# Patient Record
Sex: FEMALE | Race: WHITE | NOT HISPANIC OR LATINO | URBAN - METROPOLITAN AREA
[De-identification: names, ages, dates, MRNs, and addresses within clinical notes are randomized per-mention and may not be internally consistent; named-entity substitution may affect disease eponyms.]

---

## 2021-05-07 ENCOUNTER — OFFICE VISIT (OUTPATIENT)
Dept: URBAN - METROPOLITAN AREA CLINIC 113 | Facility: CLINIC | Age: 66
End: 2021-05-07
Payer: MEDICARE

## 2021-05-07 ENCOUNTER — LAB OUTSIDE AN ENCOUNTER (OUTPATIENT)
Dept: URBAN - METROPOLITAN AREA CLINIC 113 | Facility: CLINIC | Age: 66
End: 2021-05-07

## 2021-05-07 ENCOUNTER — WEB ENCOUNTER (OUTPATIENT)
Dept: URBAN - METROPOLITAN AREA CLINIC 113 | Facility: CLINIC | Age: 66
End: 2021-05-07

## 2021-05-07 VITALS
RESPIRATION RATE: 20 BRPM | DIASTOLIC BLOOD PRESSURE: 81 MMHG | BODY MASS INDEX: 35.84 KG/M2 | WEIGHT: 242 LBS | HEIGHT: 69 IN | TEMPERATURE: 97.7 F | HEART RATE: 54 BPM | SYSTOLIC BLOOD PRESSURE: 142 MMHG

## 2021-05-07 DIAGNOSIS — K57.92 DIVERTICULITIS: ICD-10-CM

## 2021-05-07 PROCEDURE — 99204 OFFICE O/P NEW MOD 45 MIN: CPT | Performed by: INTERNAL MEDICINE

## 2021-05-07 RX ORDER — METRONIDAZOLE 500 MG/1
1 TABLET TABLET ORAL THREE TIMES A DAY
Status: ACTIVE | COMMUNITY

## 2021-05-07 RX ORDER — GLUCOSAMINE/CHONDR SU A SOD 750-600 MG
1 CAPSULE TABLET ORAL ONCE A DAY
Status: ACTIVE | COMMUNITY

## 2021-05-07 RX ORDER — MIRABEGRON 25 MG/1
1 TABLET TABLET, FILM COATED, EXTENDED RELEASE ORAL ONCE A DAY
Status: ACTIVE | COMMUNITY

## 2021-05-07 RX ORDER — CIPROFLOXACIN 500 MG/1
1 TABLET TABLET, FILM COATED ORAL
Status: ACTIVE | COMMUNITY

## 2021-05-07 RX ORDER — APIXABAN 5 MG/1
1 TABLET TABLET, FILM COATED ORAL TWICE A DAY
Status: ACTIVE | COMMUNITY

## 2021-05-07 RX ORDER — CALCIUM CARBONATE/VITAMIN D3 600 MG-20
1 TABLET WITH A MEAL TABLET ORAL ONCE A DAY
Status: ACTIVE | COMMUNITY

## 2021-05-07 RX ORDER — ROSUVASTATIN CALCIUM 20 MG/1
1 TABLET TABLET, FILM COATED ORAL ONCE A DAY
Status: ACTIVE | COMMUNITY

## 2021-05-07 RX ORDER — SODIUM, POTASSIUM,MAG SULFATES 17.5-3.13G
354ML SOLUTION, RECONSTITUTED, ORAL ORAL
Qty: 354 MILLILITER | Refills: 1 | OUTPATIENT
Start: 2021-05-07 | End: 2021-07-06

## 2021-05-07 RX ORDER — SOTALOL HYDROCHLORIDE 80 MG/1
1/2 TABLET TABLET ORAL ONCE A DAY
Status: ACTIVE | COMMUNITY

## 2021-05-07 RX ORDER — LEVOTHYROXINE SODIUM 0.05 MG/1
1 TABLET IN THE MORNING ON AN EMPTY STOMACH TABLET ORAL ONCE A DAY
Status: ACTIVE | COMMUNITY

## 2021-05-07 NOTE — HPI-TODAY'S VISIT:
The patient is a 65-year-old female with a history of adenomatous colon polyps and a family history of colorectal cancer who presents for follow-up of acute diverticulitis.  She has had multiple bouts of Diverticulitis in the past, dating back to her 40s, spent about 25 years.  Over the last 20 years, she has had 6 distinct episodes, all of which been uncomplicated.  She had an episode of diverticulitis in January 2020, and was seen at [facility], where a CT scan showed mild sigmoid colon diverticulitis.  She was treated with a single dose of IV Zosyn, followed by Augmentin, and completed 10 days of therapy with this with no recurrence.  She did well after that until about 2 weeks ago, when she had an episode of fairly typical Dr. colitis and took Augmentin for 4 days which seem to resolve her symptoms.  Unfortunately, the symptoms returned 3 days ago, and have been quite severe.  She was seen in the [place] ER 3 days ago, and had labs done and a CT scan which again showed acute uncomplicated mild diverticulitis.  She was placed on ciprofloxacin and Flagyl, and her symptoms have gradually improved since then.  She is still taking that therapy and plans take a full 10 day course.  Of note, the patient did see a gastrologist in [place] recommended that she see a colorectal surgeon for consideration of colon surgery/bowel resection.  This recommendation  prompted her to seek a second opinion.  Of note, the patient has had regular colonoscopies since age 41.  She has had polyps in the past.  Her last colonoscopy from Dr. Cedric Romero in [place] for years ago, but he is retired.  We do not have copies of his report.

## 2021-05-07 NOTE — EXAM-FUNCTIONAL ASSESSMENT
General--no acute distress Eyes--anicteric HENT--normocephalic, atraumatic head Neck--no lymphadenopathy Chest--normal breath sounds Heart--regular rate and rhythm Abdomen--soft, LLQ mildly tender, non distended, bowel sounds present Musculoskeletal--normal gait and station Skin--no rashes Neurologic--Alert and oriented x 3 Psychiatric--stable mood, appropriate affect

## 2021-05-10 ENCOUNTER — TELEPHONE ENCOUNTER (OUTPATIENT)
Dept: URBAN - METROPOLITAN AREA CLINIC 113 | Facility: CLINIC | Age: 66
End: 2021-05-10

## 2021-05-10 RX ORDER — ROSUVASTATIN CALCIUM 20 MG/1
1 TABLET TABLET, FILM COATED ORAL ONCE A DAY
Status: ACTIVE | COMMUNITY

## 2021-05-10 RX ORDER — LEVOTHYROXINE SODIUM 0.05 MG/1
1 TABLET IN THE MORNING ON AN EMPTY STOMACH TABLET ORAL ONCE A DAY
Status: ACTIVE | COMMUNITY

## 2021-05-10 RX ORDER — SODIUM, POTASSIUM,MAG SULFATES 17.5-3.13G
354ML SOLUTION, RECONSTITUTED, ORAL ORAL
Qty: 354 MILLILITER | Refills: 1 | Status: ACTIVE | COMMUNITY
Start: 2021-05-07 | End: 2021-07-06

## 2021-05-10 RX ORDER — MIRABEGRON 25 MG/1
1 TABLET TABLET, FILM COATED, EXTENDED RELEASE ORAL ONCE A DAY
Status: ACTIVE | COMMUNITY

## 2021-05-10 RX ORDER — APIXABAN 5 MG/1
1 TABLET TABLET, FILM COATED ORAL TWICE A DAY
Status: ACTIVE | COMMUNITY

## 2021-05-10 RX ORDER — METRONIDAZOLE 500 MG/1
1 TABLET TABLET ORAL THREE TIMES A DAY
Status: ACTIVE | COMMUNITY

## 2021-05-10 RX ORDER — SODIUM PICOSULFATE, MAGNESIUM OXIDE, AND ANHYDROUS CITRIC ACID 10; 3.5; 12 MG/160ML; G/160ML; G/160ML
160 ML LIQUID ORAL
Qty: 320 MILLILITER | Refills: 0 | OUTPATIENT
Start: 2021-05-10 | End: 2021-05-11

## 2021-05-10 RX ORDER — CIPROFLOXACIN 500 MG/1
1 TABLET TABLET, FILM COATED ORAL
Status: ACTIVE | COMMUNITY

## 2021-05-10 RX ORDER — GLUCOSAMINE/CHONDR SU A SOD 750-600 MG
1 CAPSULE TABLET ORAL ONCE A DAY
Status: ACTIVE | COMMUNITY

## 2021-05-10 RX ORDER — CALCIUM CARBONATE/VITAMIN D3 600 MG-20
1 TABLET WITH A MEAL TABLET ORAL ONCE A DAY
Status: ACTIVE | COMMUNITY

## 2021-05-10 RX ORDER — SOTALOL HYDROCHLORIDE 80 MG/1
1/2 TABLET TABLET ORAL ONCE A DAY
Status: ACTIVE | COMMUNITY

## 2021-05-21 PROBLEM — 15167005 ALCOHOL ABUSE: Status: ACTIVE | Noted: 2021-05-21

## 2021-07-21 ENCOUNTER — TELEPHONE ENCOUNTER (OUTPATIENT)
Dept: URBAN - METROPOLITAN AREA CLINIC 113 | Facility: CLINIC | Age: 66
End: 2021-07-21

## 2021-07-21 RX ORDER — CIPROFLOXACIN 500 MG/1
1 TABLET TABLET, FILM COATED ORAL
Qty: 20 TABLET | Refills: 0

## 2021-07-21 RX ORDER — METRONIDAZOLE 500 MG/1
1 TABLET TABLET ORAL THREE TIMES A DAY
Qty: 30 TABLET | Refills: 0

## 2021-08-10 ENCOUNTER — TELEPHONE ENCOUNTER (OUTPATIENT)
Dept: URBAN - METROPOLITAN AREA CLINIC 113 | Facility: CLINIC | Age: 66
End: 2021-08-10

## 2021-08-11 PROBLEM — 307496006: Status: ACTIVE | Noted: 2021-05-07

## 2021-08-19 ENCOUNTER — OFFICE VISIT (OUTPATIENT)
Dept: URBAN - METROPOLITAN AREA SURGERY CENTER 25 | Facility: SURGERY CENTER | Age: 66
End: 2021-08-19

## 2021-08-30 ENCOUNTER — TELEPHONE ENCOUNTER (OUTPATIENT)
Dept: URBAN - METROPOLITAN AREA CLINIC 113 | Facility: CLINIC | Age: 66
End: 2021-08-30

## 2021-08-30 RX ORDER — CIPROFLOXACIN 500 MG/1
1 TABLET TABLET, FILM COATED ORAL
Qty: 20 TABLET | Refills: 0
End: 2021-09-09

## 2021-08-30 RX ORDER — METRONIDAZOLE 500 MG/1
1 TABLET TABLET ORAL THREE TIMES A DAY
Qty: 30 TABLET | Refills: 0
End: 2021-09-09

## 2021-09-09 ENCOUNTER — CLAIMS CREATED FROM THE CLAIM WINDOW (OUTPATIENT)
Dept: URBAN - METROPOLITAN AREA CLINIC 4 | Facility: CLINIC | Age: 66
End: 2021-09-09
Payer: MEDICARE

## 2021-09-09 ENCOUNTER — OFFICE VISIT (OUTPATIENT)
Dept: URBAN - METROPOLITAN AREA SURGERY CENTER 25 | Facility: SURGERY CENTER | Age: 66
End: 2021-09-09
Payer: MEDICARE

## 2021-09-09 DIAGNOSIS — R93.3 ABN FINDINGS-GI TRACT: ICD-10-CM

## 2021-09-09 DIAGNOSIS — D12.3 BENIGN NEOPLASM OF TRANSVERSE COLON: ICD-10-CM

## 2021-09-09 DIAGNOSIS — D12.3 ADENOMA OF TRANSVERSE COLON: ICD-10-CM

## 2021-09-09 DIAGNOSIS — D12.2 ADENOMA OF ASCENDING COLON: ICD-10-CM

## 2021-09-09 DIAGNOSIS — D12.2 BENIGN NEOPLASM OF ASCENDING COLON: ICD-10-CM

## 2021-09-09 DIAGNOSIS — Z87.19 H/O DIVERTICULITIS OF COLON: ICD-10-CM

## 2021-09-09 PROCEDURE — G8907 PT DOC NO EVENTS ON DISCHARG: HCPCS | Performed by: INTERNAL MEDICINE

## 2021-09-09 PROCEDURE — 88305 TISSUE EXAM BY PATHOLOGIST: CPT | Performed by: PATHOLOGY

## 2021-09-09 PROCEDURE — 45385 COLONOSCOPY W/LESION REMOVAL: CPT | Performed by: INTERNAL MEDICINE

## 2021-09-09 RX ORDER — APIXABAN 5 MG/1
1 TABLET TABLET, FILM COATED ORAL TWICE A DAY
Status: ACTIVE | COMMUNITY

## 2021-09-09 RX ORDER — MIRABEGRON 25 MG/1
1 TABLET TABLET, FILM COATED, EXTENDED RELEASE ORAL ONCE A DAY
Status: ACTIVE | COMMUNITY

## 2021-09-09 RX ORDER — GLUCOSAMINE/CHONDR SU A SOD 750-600 MG
1 CAPSULE TABLET ORAL ONCE A DAY
Status: ACTIVE | COMMUNITY

## 2021-09-09 RX ORDER — ROSUVASTATIN CALCIUM 20 MG/1
1 TABLET TABLET, FILM COATED ORAL ONCE A DAY
Status: ACTIVE | COMMUNITY

## 2021-09-09 RX ORDER — LEVOTHYROXINE SODIUM 0.05 MG/1
1 TABLET IN THE MORNING ON AN EMPTY STOMACH TABLET ORAL ONCE A DAY
Status: ACTIVE | COMMUNITY

## 2021-09-09 RX ORDER — METRONIDAZOLE 500 MG/1
1 TABLET TABLET ORAL THREE TIMES A DAY
Qty: 30 TABLET | Refills: 0 | Status: ACTIVE | COMMUNITY
End: 2021-09-09

## 2021-09-09 RX ORDER — SOTALOL HYDROCHLORIDE 80 MG/1
1/2 TABLET TABLET ORAL ONCE A DAY
Status: ACTIVE | COMMUNITY

## 2021-09-09 RX ORDER — CIPROFLOXACIN 500 MG/1
1 TABLET TABLET, FILM COATED ORAL
Qty: 20 TABLET | Refills: 0 | Status: ACTIVE | COMMUNITY
End: 2021-09-09

## 2021-09-09 RX ORDER — CALCIUM CARBONATE/VITAMIN D3 600 MG-20
1 TABLET WITH A MEAL TABLET ORAL ONCE A DAY
Status: ACTIVE | COMMUNITY

## 2021-09-13 ENCOUNTER — OFFICE VISIT (OUTPATIENT)
Dept: URBAN - METROPOLITAN AREA CLINIC 107 | Facility: CLINIC | Age: 66
End: 2021-09-13

## 2021-10-07 ENCOUNTER — OFFICE VISIT (OUTPATIENT)
Dept: URBAN - METROPOLITAN AREA CLINIC 113 | Facility: CLINIC | Age: 66
End: 2021-10-07

## 2021-11-09 ENCOUNTER — OFFICE VISIT (OUTPATIENT)
Dept: URBAN - METROPOLITAN AREA CLINIC 107 | Facility: CLINIC | Age: 66
End: 2021-11-09

## 2021-12-02 ENCOUNTER — OFFICE VISIT (OUTPATIENT)
Dept: URBAN - METROPOLITAN AREA CLINIC 113 | Facility: CLINIC | Age: 66
End: 2021-12-02

## 2021-12-30 ENCOUNTER — OFFICE VISIT (OUTPATIENT)
Dept: URBAN - METROPOLITAN AREA CLINIC 113 | Facility: CLINIC | Age: 66
End: 2021-12-30
Payer: MEDICARE

## 2021-12-30 VITALS
WEIGHT: 244 LBS | HEIGHT: 69 IN | HEART RATE: 59 BPM | SYSTOLIC BLOOD PRESSURE: 151 MMHG | BODY MASS INDEX: 36.14 KG/M2 | DIASTOLIC BLOOD PRESSURE: 84 MMHG | TEMPERATURE: 97.8 F

## 2021-12-30 DIAGNOSIS — R10.32 LLQ ABDOMINAL PAIN: ICD-10-CM

## 2021-12-30 DIAGNOSIS — K57.32 DIVERTICULITIS, COLON: ICD-10-CM

## 2021-12-30 DIAGNOSIS — Z86.010 HISTORY OF ADENOMATOUS POLYP OF COLON: ICD-10-CM

## 2021-12-30 DIAGNOSIS — K57.30 COLON, DIVERTICULOSIS: ICD-10-CM

## 2021-12-30 PROCEDURE — 99214 OFFICE O/P EST MOD 30 MIN: CPT | Performed by: NURSE PRACTITIONER

## 2021-12-30 RX ORDER — SOTALOL HYDROCHLORIDE 80 MG/1
1/2 TABLET TABLET ORAL ONCE A DAY
Status: ACTIVE | COMMUNITY

## 2021-12-30 RX ORDER — CIPROFLOXACIN HYDROCHLORIDE 500 MG/1
1 TABLET TABLET, FILM COATED ORAL
Qty: 28 TABLET | Refills: 0 | OUTPATIENT

## 2021-12-30 RX ORDER — ROSUVASTATIN CALCIUM 20 MG/1
1 TABLET TABLET, FILM COATED ORAL ONCE A DAY
Status: ACTIVE | COMMUNITY

## 2021-12-30 RX ORDER — LEVOTHYROXINE SODIUM 0.05 MG/1
1 TABLET IN THE MORNING ON AN EMPTY STOMACH TABLET ORAL ONCE A DAY
Status: ACTIVE | COMMUNITY

## 2021-12-30 RX ORDER — GLUCOSAMINE/CHONDR SU A SOD 750-600 MG
1 CAPSULE TABLET ORAL ONCE A DAY
Status: ACTIVE | COMMUNITY

## 2021-12-30 RX ORDER — CALCIUM CARBONATE/VITAMIN D3 600 MG-20
1 TABLET WITH A MEAL TABLET ORAL ONCE A DAY
Status: ACTIVE | COMMUNITY

## 2021-12-30 RX ORDER — MIRABEGRON 25 MG/1
1 TABLET TABLET, FILM COATED, EXTENDED RELEASE ORAL ONCE A DAY
Status: ACTIVE | COMMUNITY

## 2021-12-30 RX ORDER — METRONIDAZOLE 500 MG/1
1 TABLET TABLET ORAL THREE TIMES A DAY
Qty: 42 TABLET | Refills: 0 | OUTPATIENT

## 2021-12-30 RX ORDER — DICYCLOMINE HYDROCHLORIDE 10 MG/1
1 TABLET CAPSULE ORAL
Qty: 60 | Refills: 3 | OUTPATIENT
Start: 2021-12-30 | End: 2022-04-29

## 2021-12-30 RX ORDER — APIXABAN 5 MG/1
1 TABLET TABLET, FILM COATED ORAL TWICE A DAY
Status: ACTIVE | COMMUNITY

## 2021-12-30 NOTE — PHYSICAL EXAM GASTROINTESTINAL
Abdomen , soft, mildly tender LLQ, nondistended , no guarding or rigidity , no masses palpable , normal bowel sounds , Liver and Spleen , no hepatosplenomegaly

## 2021-12-30 NOTE — HPI-OTHER HISTORIES
Colonoscopy 9/9/2021:BBPS 9, removal of 4 sessile transverse, hepatic flexure, and proximal ascending 7 to 13 mm polyps, sigmoid and descending diverticulosis, normal terminal ileum, mild grade 1 internal hemorrhoids.  Pathology: Hepatic flexure polyps were tubular adenomas, ascending polyps were sessile serrated adenomas, and transverse polyps were sessile serrated adenomas.  Surveillance recommended in 3 years.

## 2021-12-30 NOTE — HPI-TODAY'S VISIT:
This is a 66-year-old female with a history of uncomplicated diverticulitis and adenomatous colon polyps presenting for follow-up after a colonoscopy. She was last seen 5/7/2021.  She reported 6 bouts of diverticulitis over 20 years.  She reported 2 bouts within 18 months of her visit.  She was completing a round of Cipro and Flagyl.  She was instructed to continue antibiotics for 10 days and a colonoscopy was recommended in 3 months.  She reports onset of left lower quadrant pain in the evening 12/28/2021.  She stopped eating and began a liquid diet.  She also reports associated constipation stating that she did not have a bowel movement the day of onset.  She had a bowel movement today.  She reports some improvement of abdominal pain.  However, it has not resolved.  She states this is the first episode of a possible diverticulitis that she has experienced since September.  She is taking Benefiber one half sleeve daily.  She reports "irritation" on a full packet.  She denies red blood per rectum, melena, nausea, heartburn, or dysphagia.  She denies fever. She reports her most recent CT scan was performed at [place] this past summer.  She underwent a cardiac ablation in October due to atrial fibrillation.

## 2022-05-05 ENCOUNTER — OFFICE VISIT (OUTPATIENT)
Dept: URBAN - METROPOLITAN AREA CLINIC 113 | Facility: CLINIC | Age: 67
End: 2022-05-05

## 2022-06-24 ENCOUNTER — OFFICE VISIT (OUTPATIENT)
Dept: URBAN - METROPOLITAN AREA CLINIC 113 | Facility: CLINIC | Age: 67
End: 2022-06-24
Payer: MEDICARE

## 2022-06-24 VITALS
HEIGHT: 69 IN | RESPIRATION RATE: 20 BRPM | BODY MASS INDEX: 36.73 KG/M2 | SYSTOLIC BLOOD PRESSURE: 154 MMHG | HEART RATE: 63 BPM | WEIGHT: 248 LBS | TEMPERATURE: 97.5 F | DIASTOLIC BLOOD PRESSURE: 90 MMHG

## 2022-06-24 DIAGNOSIS — Z86.010 HISTORY OF ADENOMATOUS POLYP OF COLON: ICD-10-CM

## 2022-06-24 DIAGNOSIS — K57.30 COLON, DIVERTICULOSIS: ICD-10-CM

## 2022-06-24 DIAGNOSIS — Z87.19 HISTORY OF DIVERTICULITIS: ICD-10-CM

## 2022-06-24 PROBLEM — 733657002: Status: ACTIVE | Noted: 2021-12-30

## 2022-06-24 PROBLEM — 429047008: Status: ACTIVE | Noted: 2021-12-30

## 2022-06-24 PROBLEM — 711150003: Status: ACTIVE | Noted: 2022-06-24

## 2022-06-24 PROCEDURE — 99213 OFFICE O/P EST LOW 20 MIN: CPT | Performed by: NURSE PRACTITIONER

## 2022-06-24 RX ORDER — GLUCOSAMINE/CHONDR SU A SOD 750-600 MG
1 CAPSULE TABLET ORAL ONCE A DAY
Status: ACTIVE | COMMUNITY

## 2022-06-24 RX ORDER — DOFETILIDE 0.12 MG/1
1 CAPSULE CAPSULE ORAL TWICE A DAY
Status: ACTIVE | COMMUNITY

## 2022-06-24 RX ORDER — AMOXICILLIN AND CLAVULANATE POTASSIUM 875; 125 MG/1; MG/1
1 TABLET TABLET, FILM COATED ORAL
Qty: 20 | OUTPATIENT
Start: 2022-06-24 | End: 2022-07-04

## 2022-06-24 RX ORDER — MIRABEGRON 25 MG/1
1 TABLET TABLET, FILM COATED, EXTENDED RELEASE ORAL ONCE A DAY
Status: ACTIVE | COMMUNITY

## 2022-06-24 RX ORDER — ROSUVASTATIN CALCIUM 20 MG/1
1 TABLET TABLET, FILM COATED ORAL ONCE A DAY
Status: ACTIVE | COMMUNITY

## 2022-06-24 RX ORDER — CALCIUM CARBONATE/VITAMIN D3 600 MG-20
1 TABLET WITH A MEAL TABLET ORAL ONCE A DAY
Status: ACTIVE | COMMUNITY

## 2022-06-24 RX ORDER — LEVOTHYROXINE SODIUM 0.05 MG/1
1 TABLET IN THE MORNING ON AN EMPTY STOMACH TABLET ORAL ONCE A DAY
Status: ACTIVE | COMMUNITY

## 2022-06-24 RX ORDER — CIPROFLOXACIN HYDROCHLORIDE 500 MG/1
1 TABLET TABLET, FILM COATED ORAL
Qty: 28 TABLET | Refills: 0 | Status: ON HOLD | COMMUNITY

## 2022-06-24 RX ORDER — METRONIDAZOLE 500 MG/1
1 TABLET TABLET ORAL THREE TIMES A DAY
Qty: 42 TABLET | Refills: 0 | Status: ON HOLD | COMMUNITY

## 2022-06-24 RX ORDER — APIXABAN 5 MG/1
1 TABLET TABLET, FILM COATED ORAL TWICE A DAY
Status: ACTIVE | COMMUNITY

## 2022-06-24 RX ORDER — SOTALOL HYDROCHLORIDE 80 MG/1
1/2 TABLET TABLET ORAL ONCE A DAY
Status: ON HOLD | COMMUNITY

## 2022-06-24 NOTE — HPI-TODAY'S VISIT:
This is a 66-year-old female with a history of uncomplicated diverticulitis and adenomatous colon polyps presenting for follow-up. She was last seen 12/30/2021.  She was experiencing left lower quadrant pain suspicious for recurrent diverticulitis.  She was prescribed Cipro and Flagyl.  Hyoscyamine was prescribed for symptomatic relief of abdominal pain.  It was discussed that constipation may be contributing to her symptoms.  She plan to take a dose of magnesium citrate when she returns home.  She was to call if her symptoms persisted.  She had undergone a colonoscopy in September 2021 notable for removal of 4 adenomas 1 of which was 13 mm in size.  Surveillance was recommended in September 2024. She states she has been doing well.  She is avoiding salads and raw vegetables.  She had 1 episode of diverticulitis in January or February.  She had Cipro and Flagyl on hand and took them.  Her symptoms resolved.  She is taking Benefiber or MiraLAX daily in order to keep her bowels regular and soft.  She denies any abdominal symptoms. She has a history of atrial fibrillation.  Her antiarrhythmic has been changed to Tikosyn.  She is concerned regarding a possible interaction between Tikosyn and Cipro.  She is requesting medication to have on hand should she experience symptoms in the future and is requesting a different regimen because of this possible interaction.  She has hyoscyamine on hand should she require it.

## 2023-02-20 ENCOUNTER — WEB ENCOUNTER (OUTPATIENT)
Dept: URBAN - METROPOLITAN AREA CLINIC 113 | Facility: CLINIC | Age: 68
End: 2023-02-20

## 2023-02-21 ENCOUNTER — OFFICE VISIT (OUTPATIENT)
Dept: URBAN - METROPOLITAN AREA CLINIC 107 | Facility: CLINIC | Age: 68
End: 2023-02-21
Payer: MEDICARE

## 2023-02-21 VITALS
BODY MASS INDEX: 36.58 KG/M2 | TEMPERATURE: 98.3 F | HEIGHT: 69 IN | SYSTOLIC BLOOD PRESSURE: 158 MMHG | DIASTOLIC BLOOD PRESSURE: 91 MMHG | WEIGHT: 247 LBS | HEART RATE: 67 BPM

## 2023-02-21 DIAGNOSIS — K57.32 DIVERTICULITIS, COLON: ICD-10-CM

## 2023-02-21 DIAGNOSIS — R93.5 ABNORMAL CT OF THE ABDOMEN: ICD-10-CM

## 2023-02-21 DIAGNOSIS — R10.32 LLQ ABDOMINAL PAIN: ICD-10-CM

## 2023-02-21 PROBLEM — 111359004: Status: ACTIVE | Noted: 2021-12-30

## 2023-02-21 PROBLEM — 15634181000119107: Status: ACTIVE | Noted: 2023-02-21

## 2023-02-21 PROBLEM — 301716002: Status: ACTIVE | Noted: 2021-12-30

## 2023-02-21 PROCEDURE — 99214 OFFICE O/P EST MOD 30 MIN: CPT | Performed by: NURSE PRACTITIONER

## 2023-02-21 RX ORDER — CALCIUM CARBONATE/VITAMIN D3 600 MG-20
1 TABLET WITH A MEAL TABLET ORAL ONCE A DAY
Status: ACTIVE | COMMUNITY

## 2023-02-21 RX ORDER — ROSUVASTATIN CALCIUM 20 MG/1
1 TABLET TABLET, FILM COATED ORAL ONCE A DAY
Status: ACTIVE | COMMUNITY

## 2023-02-21 RX ORDER — MIRABEGRON 25 MG/1
1 TABLET TABLET, FILM COATED, EXTENDED RELEASE ORAL ONCE A DAY
Status: ACTIVE | COMMUNITY

## 2023-02-21 RX ORDER — DOFETILIDE 0.12 MG/1
1 CAPSULE CAPSULE ORAL TWICE A DAY
Status: ACTIVE | COMMUNITY

## 2023-02-21 RX ORDER — GLUCOSAMINE/CHONDR SU A SOD 750-600 MG
1 CAPSULE TABLET ORAL ONCE A DAY
Status: ACTIVE | COMMUNITY

## 2023-02-21 RX ORDER — APIXABAN 5 MG/1
1 TABLET TABLET, FILM COATED ORAL TWICE A DAY
Status: ACTIVE | COMMUNITY

## 2023-02-21 RX ORDER — LEVOTHYROXINE SODIUM 0.05 MG/1
1 TABLET IN THE MORNING ON AN EMPTY STOMACH TABLET ORAL ONCE A DAY
Status: ACTIVE | COMMUNITY

## 2023-02-21 RX ORDER — AMOXICILLIN AND CLAVULANATE POTASSIUM 875; 125 MG/1; MG/1
1 TABLET TABLET, FILM COATED ORAL
Status: ACTIVE | COMMUNITY

## 2023-02-21 NOTE — HPI-TODAY'S VISIT:
This is a 67-year-old female with a history of uncomplicated diverticulitis and adenomatous colon polyps due surveillance in 2024 presenting for evaluation of diverticulitis. She was last seen 6/24/2022.  She had a history of diverticulosis with recurrent diverticulitis.  She had been asymptomatic since January or February.  She had a round of Cipro and Flagyl on hand and her symptoms resolved after therapy.  She is having regular bowel movements on fiber and MiraLAX.  She was requesting medication to have on hand should she have symptoms when she was traveling.  There was a moderate interaction between Cipro and Tikosyn resulting in possible increased QT interval.  She was prescribed a course of Augmentin. She states she has done well for the last 14 months.  She reports onset of left lower quadrant pain 2/14/2023.  She began taking Augmentin 875 mg every 12 hours.  She had a fevers over 100 and "hard cramps".  She had persistent pain for which she went to the emergency department at [facility] 2/18/2023.  She reports that she had diverticulitis on CT scan and "something" next to it.  She was given a Rocephin IV and a prescription for 7 more days of Augmentin.  She was prescribed hydrocodone which she is not taking.  She is using Tylenol for pain.  She has continued to run a low-grade fever which resolved yesterday evening.  She has consistent, dull left lower quadrant pain and feels unwell in general.  She also reports pressure in this area.  She is taking daily fiber and MiraLAX.  She reports mild constipation after medication was given in the emergency department.  This is improved. Labs 2/18/2023:Urinalysis normal.  BMP normal.  LFTs: TB 1.3, ALP 88, ALT 15, AST 15.  Lipase 25.  CBC: WBC 10.29, hemoglobin 10.6, MCV 65.2, platelet 230. CT of the abdomen and pelvis without contrast 2/18/2023:Chronic diverticulosis with acute diverticulitis of the sigmoid colon.  Adjacent soft tissue density in the pelvis (4.7 x 2.3 x 3.5 cm) possibly representing developing collection versus inflamed left ovary.  Multiple hepatic cysts.

## 2023-02-21 NOTE — PHYSICAL EXAM GASTROINTESTINAL
Abdomen , soft, mild LLQ tenderness, nondistended , no guarding or rigidity , no masses palpable , normal bowel sounds , Liver and Spleen , no hepatosplenomegaly

## 2023-03-29 ENCOUNTER — OFFICE VISIT (OUTPATIENT)
Dept: URBAN - METROPOLITAN AREA CLINIC 113 | Facility: CLINIC | Age: 68
End: 2023-03-29
Payer: MEDICARE

## 2023-03-29 VITALS
HEIGHT: 69 IN | HEART RATE: 64 BPM | BODY MASS INDEX: 36.81 KG/M2 | DIASTOLIC BLOOD PRESSURE: 96 MMHG | TEMPERATURE: 97.5 F | RESPIRATION RATE: 20 BRPM | SYSTOLIC BLOOD PRESSURE: 156 MMHG | WEIGHT: 248.5 LBS

## 2023-03-29 DIAGNOSIS — K59.09 CHRONIC CONSTIPATION: ICD-10-CM

## 2023-03-29 DIAGNOSIS — R10.32 LLQ ABDOMINAL PAIN: ICD-10-CM

## 2023-03-29 PROCEDURE — 99213 OFFICE O/P EST LOW 20 MIN: CPT | Performed by: NURSE PRACTITIONER

## 2023-03-29 RX ORDER — GLUCOSAMINE/CHONDR SU A SOD 750-600 MG
1 CAPSULE TABLET ORAL ONCE A DAY
Status: ACTIVE | COMMUNITY

## 2023-03-29 RX ORDER — AMOXICILLIN AND CLAVULANATE POTASSIUM 875; 125 MG/1; MG/1
1 TABLET TABLET, FILM COATED ORAL
Qty: 20 | Refills: 0

## 2023-03-29 RX ORDER — CALCIUM CARBONATE/VITAMIN D3 600 MG-20
1 TABLET WITH A MEAL TABLET ORAL ONCE A DAY
Status: ACTIVE | COMMUNITY

## 2023-03-29 RX ORDER — LEVOTHYROXINE SODIUM 0.05 MG/1
1 TABLET IN THE MORNING ON AN EMPTY STOMACH TABLET ORAL ONCE A DAY
Status: ACTIVE | COMMUNITY

## 2023-03-29 RX ORDER — APIXABAN 5 MG/1
1 TABLET TABLET, FILM COATED ORAL TWICE A DAY
Status: ACTIVE | COMMUNITY

## 2023-03-29 RX ORDER — ROSUVASTATIN CALCIUM 20 MG/1
1 TABLET TABLET, FILM COATED ORAL ONCE A DAY
Status: ACTIVE | COMMUNITY

## 2023-03-29 RX ORDER — MIRABEGRON 25 MG/1
1 TABLET TABLET, FILM COATED, EXTENDED RELEASE ORAL ONCE A DAY
Status: ACTIVE | COMMUNITY

## 2023-03-29 RX ORDER — AMOXICILLIN AND CLAVULANATE POTASSIUM 875; 125 MG/1; MG/1
1 TABLET TABLET, FILM COATED ORAL
Status: ACTIVE | COMMUNITY

## 2023-03-29 RX ORDER — DOFETILIDE 0.12 MG/1
1 CAPSULE CAPSULE ORAL TWICE A DAY
Status: ACTIVE | COMMUNITY

## 2023-03-29 NOTE — HPI-OTHER HISTORIES
CT of the abdomen and pelvis with and without contrast 2/23/2023:Complete versus near complete resolution of acute sigmoid diverticulitis seen on recent outside study February 18, 2023.  The abnormality adjacent to the sigmoid colon of concern on this recent prior study is confirmed to reflect the left ovary on today's exam, this ovary now normal in appearance without inflammation evident.  No diverticulitis complications evident on today's exam.

## 2023-03-29 NOTE — HPI-TODAY'S VISIT:
This is a 67-year-old female with a history of uncomplicated diverticulitis and adenomatous colon polyps due surveillance in 2024 presenting for follow-up regarding diverticulitis. She was last seen 2/21/2023.  She reported onset of left lower quadrant pain in mid February.  She began taking Augmentin 875 mg every 12 hours.  She had fevers over 100 and went to the emergency department at [facility].  She was given Rocephin and an additional 7 days of Augmentin.  She had persistent, dull left lower quadrant pain and felt unwell in general.  CT scan without contrast in the emergency department demonstrated a 4.7 x 2.3 x 3.5 cm soft tissue density in the left pelvis possibly representing developing collection versus inflamed left ovary.  An urgent CT of the abdomen and pelvis with oral and IV contrast was ordered. She reports resolution of abdominal pain.  She is taking MiraLAX every other day and Benefiber every other day.  She will eventually "migrate" to Benefiber daily.  She is having regular bowel movements.  Yesterday, after eating, she had an episode of nausea, vomiting, and diarrhea.  She feels as though she may have had "food poisoning."  This has not resolved.  She is currently asymptomatic.

## 2023-04-01 PROBLEM — 236069009: Status: ACTIVE | Noted: 2023-04-01

## 2023-11-09 ENCOUNTER — CLAIMS CREATED FROM THE CLAIM WINDOW (OUTPATIENT)
Dept: URBAN - METROPOLITAN AREA CLINIC 113 | Facility: CLINIC | Age: 68
End: 2023-11-09
Payer: MEDICARE

## 2023-11-09 ENCOUNTER — DASHBOARD ENCOUNTERS (OUTPATIENT)
Age: 68
End: 2023-11-09

## 2023-11-09 VITALS
SYSTOLIC BLOOD PRESSURE: 138 MMHG | DIASTOLIC BLOOD PRESSURE: 99 MMHG | HEIGHT: 69 IN | WEIGHT: 246.5 LBS | TEMPERATURE: 97.5 F | BODY MASS INDEX: 36.51 KG/M2 | RESPIRATION RATE: 18 BRPM | HEART RATE: 60 BPM

## 2023-11-09 DIAGNOSIS — K57.30 COLON, DIVERTICULOSIS: ICD-10-CM

## 2023-11-09 DIAGNOSIS — K59.09 CHRONIC CONSTIPATION: ICD-10-CM

## 2023-11-09 DIAGNOSIS — Z86.010 HISTORY OF ADENOMATOUS POLYP OF COLON: ICD-10-CM

## 2023-11-09 DIAGNOSIS — E66.8 OTHER OBESITY: ICD-10-CM

## 2023-11-09 PROBLEM — 414916001: Status: ACTIVE | Noted: 2023-11-09

## 2023-11-09 PROCEDURE — 99213 OFFICE O/P EST LOW 20 MIN: CPT | Performed by: NURSE PRACTITIONER

## 2023-11-09 RX ORDER — ROSUVASTATIN CALCIUM 20 MG/1
1 TABLET TABLET, FILM COATED ORAL ONCE A DAY
Status: ACTIVE | COMMUNITY

## 2023-11-09 RX ORDER — LEVOTHYROXINE SODIUM 0.05 MG/1
1 TABLET IN THE MORNING ON AN EMPTY STOMACH TABLET ORAL ONCE A DAY
Status: ACTIVE | COMMUNITY

## 2023-11-09 RX ORDER — MIRABEGRON 25 MG/1
1 TABLET TABLET, FILM COATED, EXTENDED RELEASE ORAL ONCE A DAY
Status: ACTIVE | COMMUNITY

## 2023-11-09 RX ORDER — AMOXICILLIN AND CLAVULANATE POTASSIUM 875; 125 MG/1; MG/1
1 TABLET TABLET, FILM COATED ORAL
Qty: 20 | Refills: 0 | Status: ON HOLD | COMMUNITY

## 2023-11-09 RX ORDER — DOFETILIDE 0.12 MG/1
1 CAPSULE CAPSULE ORAL TWICE A DAY
Status: ACTIVE | COMMUNITY

## 2023-11-09 RX ORDER — CALCIUM CARBONATE/VITAMIN D3 600 MG-20
1 TABLET WITH A MEAL TABLET ORAL ONCE A DAY
Status: ACTIVE | COMMUNITY

## 2023-11-09 RX ORDER — APIXABAN 5 MG/1
1 TABLET TABLET, FILM COATED ORAL TWICE A DAY
Status: ACTIVE | COMMUNITY

## 2023-11-09 RX ORDER — GLUCOSAMINE/CHONDR SU A SOD 750-600 MG
1 CAPSULE TABLET ORAL ONCE A DAY
Status: ACTIVE | COMMUNITY

## 2023-11-09 RX ORDER — ESTRADIOL 0.1 MG/G
AS DIRECTED CREAM VAGINAL
Status: ACTIVE | COMMUNITY

## 2023-11-09 NOTE — HPI-OTHER HISTORIES
CT of the abdomen and pelvis with and without contrast 2/23/2023:Complete versus near complete resolution of acute sigmoid diverticulitis seen on recent outside study February 18, 2023.  The abnormality adjacent to the sigmoid colon of concern on this recent prior study is confirmed to reflect the left ovary on today's exam, this ovary now normal in appearance without inflammation evident.  No diverticulitis complications evident on today's exam. Colonoscopy 9/9/2021:BBPS 9, removal of 4 sessile transverse, hepatic flexure, and proximal ascending 7 to 13 mm polyps, sigmoid and descending diverticulosis, normal terminal ileum, mild grade 1 internal hemorrhoids.  Pathology: Hepatic flexure polyps were tubular adenomas, ascending polyps were sessile serrated adenomas, and transverse polyps were sessile serrated adenomas.  Surveillance recommended in 3 years.

## 2023-11-09 NOTE — HPI-TODAY'S VISIT:
This is a 68-year-old female with a history of atrial fibrillation on Tikosyn and Eliquis s/p ablation x 4 (stable), uncomplicated diverticulitis, adenomatous colon polyps due surveillance in September 2024, chronic constipation, and left lower quadrant abdominal pain associated with diverticulitis presenting for follow-up.She was last seen in the office 3/29/2023 for follow-up regarding diverticulitis.  Her symptoms had resolved following treatment with Augmentin.  CT scan demonstrated resolution.  She was to continue daily fiber.  She was prescribed a course of Augmentin to have on hand should symptoms recur.  She was instructed to notify our office if she required it.  Chronic constipation was controlled with MiraLAX 1 capful alternating with Benefiber 2 tablespoons daily.  She plan to wean from MiraLAX and take daily fiber only. She has been doing well since her last visit.  She has not required Augmentin for possible diverticulitis.  She is taking daily fiber and uses MiraLAX as needed.  Constipation is well controlled.  She denies any other abdominal symptoms.She has establish care with Dr. Krishna who has discussed the possibility of her starting Wegovy for weight loss.  She has restricted her calorie intake to 1700 kcal/day and is lost 5 pounds.  She has been unable to lose weight further.  She reports that Dr. Krishna wanted her to discuss starting Wegovy with this given her history of GI symptoms.

## 2024-06-06 ENCOUNTER — OFFICE VISIT (OUTPATIENT)
Dept: URBAN - METROPOLITAN AREA CLINIC 113 | Facility: CLINIC | Age: 69
End: 2024-06-06

## 2024-06-13 ENCOUNTER — LAB OUTSIDE AN ENCOUNTER (OUTPATIENT)
Dept: URBAN - METROPOLITAN AREA CLINIC 113 | Facility: CLINIC | Age: 69
End: 2024-06-13

## 2024-06-13 ENCOUNTER — OFFICE VISIT (OUTPATIENT)
Dept: URBAN - METROPOLITAN AREA CLINIC 113 | Facility: CLINIC | Age: 69
End: 2024-06-13
Payer: MEDICARE

## 2024-06-13 VITALS
HEART RATE: 83 BPM | HEIGHT: 69 IN | TEMPERATURE: 97.3 F | SYSTOLIC BLOOD PRESSURE: 139 MMHG | WEIGHT: 227 LBS | BODY MASS INDEX: 33.62 KG/M2 | RESPIRATION RATE: 18 BRPM | DIASTOLIC BLOOD PRESSURE: 61 MMHG

## 2024-06-13 DIAGNOSIS — Z86.010 HISTORY OF ADENOMATOUS POLYP OF COLON: ICD-10-CM

## 2024-06-13 DIAGNOSIS — R89.9 ABNORMAL LABORATORY TEST: ICD-10-CM

## 2024-06-13 DIAGNOSIS — K59.09 CHRONIC CONSTIPATION: ICD-10-CM

## 2024-06-13 PROBLEM — 165346000: Status: ACTIVE | Noted: 2024-06-13

## 2024-06-13 PROCEDURE — 99213 OFFICE O/P EST LOW 20 MIN: CPT | Performed by: NURSE PRACTITIONER

## 2024-06-13 RX ORDER — LEVOTHYROXINE SODIUM 0.05 MG/1
1 TABLET IN THE MORNING ON AN EMPTY STOMACH TABLET ORAL ONCE A DAY
Status: ACTIVE | COMMUNITY

## 2024-06-13 RX ORDER — APIXABAN 5 MG/1
1 TABLET TABLET, FILM COATED ORAL TWICE A DAY
Status: ACTIVE | COMMUNITY

## 2024-06-13 RX ORDER — CALCIUM CARBONATE/VITAMIN D3 600 MG-20
1 TABLET WITH A MEAL TABLET ORAL ONCE A DAY
Status: ACTIVE | COMMUNITY

## 2024-06-13 RX ORDER — MIRABEGRON 25 MG/1
1 TABLET TABLET, FILM COATED, EXTENDED RELEASE ORAL ONCE A DAY
Status: ACTIVE | COMMUNITY

## 2024-06-13 RX ORDER — DOFETILIDE 0.12 MG/1
1 CAPSULE CAPSULE ORAL TWICE A DAY
Status: ACTIVE | COMMUNITY

## 2024-06-13 RX ORDER — GLUCOSAMINE/CHONDR SU A SOD 750-600 MG
1 CAPSULE TABLET ORAL ONCE A DAY
Status: ACTIVE | COMMUNITY

## 2024-06-13 RX ORDER — ESTRADIOL 0.1 MG/G
AS DIRECTED CREAM VAGINAL
Status: ACTIVE | COMMUNITY

## 2024-06-13 RX ORDER — TIRZEPATIDE 5 MG/.5ML
0.5 ML INJECTION, SOLUTION SUBCUTANEOUS
Status: ACTIVE | COMMUNITY

## 2024-06-13 RX ORDER — ROSUVASTATIN CALCIUM 20 MG/1
1 TABLET TABLET, FILM COATED ORAL ONCE A DAY
Status: ACTIVE | COMMUNITY

## 2024-06-13 RX ORDER — AMOXICILLIN AND CLAVULANATE POTASSIUM 875; 125 MG/1; MG/1
1 TABLET TABLET, FILM COATED ORAL
Qty: 20 | Refills: 0 | Status: ON HOLD | COMMUNITY

## 2024-06-13 NOTE — HPI-TODAY'S VISIT:
This is a 68-year-old female with a history of atrial fibrillation on Tikosyn and Eliquis status post ablation x 4 (stable), uncomplicated diverticulitis, adenomatous colon polyps due surveillance in September 2024, chronic constipation, and left lower quadrant abdominal pain presenting for follow-up.  She was last seen in the office 11/9/2023.  Constipation was controlled with daily fiber and MiraLAX as needed.  She was taking daily fiber due to her history of diverticulosis and diverticulitis.  She plans to start Wegovy per Dr. Tsang for weight loss.  The plan was to schedule colonoscopy at follow-up.  She states she is doing well.  She has lost weight on 7 pound.  She reports minimal side effects.  She denies heartburn, regurgitation, or abdominal pain.  She has nausea only if she overeats.  She is taking MiraLAX daily and is having bowel movements every day.  She denies red blood per rectum or melena.  She denies other abdominal symptoms.    She has been under the care of Dr. Parmar due to urinary incontinence.  She reports cancer markers were obtained.  Due to an elevation, she had a CT scan at [facility] in April of this year.  She reports no abnormalities.  He advised her to follow-up with her gastroenterologist.

## 2024-06-24 ENCOUNTER — OFFICE VISIT (OUTPATIENT)
Dept: URBAN - METROPOLITAN AREA CLINIC 113 | Facility: CLINIC | Age: 69
End: 2024-06-24

## 2024-06-27 ENCOUNTER — TELEPHONE ENCOUNTER (OUTPATIENT)
Dept: URBAN - METROPOLITAN AREA CLINIC 113 | Facility: CLINIC | Age: 69
End: 2024-06-27

## 2024-08-08 ENCOUNTER — TELEPHONE ENCOUNTER (OUTPATIENT)
Dept: URBAN - METROPOLITAN AREA CLINIC 113 | Facility: CLINIC | Age: 69
End: 2024-08-08

## 2024-08-12 ENCOUNTER — WEB ENCOUNTER (OUTPATIENT)
Dept: URBAN - METROPOLITAN AREA CLINIC 113 | Facility: CLINIC | Age: 69
End: 2024-08-12

## 2024-08-12 ENCOUNTER — TELEPHONE ENCOUNTER (OUTPATIENT)
Dept: URBAN - METROPOLITAN AREA SURGERY CENTER 25 | Facility: SURGERY CENTER | Age: 69
End: 2024-08-12

## 2024-08-19 ENCOUNTER — WEB ENCOUNTER (OUTPATIENT)
Dept: URBAN - METROPOLITAN AREA SURGERY CENTER 25 | Facility: SURGERY CENTER | Age: 69
End: 2024-08-19

## 2024-08-22 ENCOUNTER — CLAIMS CREATED FROM THE CLAIM WINDOW (OUTPATIENT)
Dept: URBAN - METROPOLITAN AREA CLINIC 4 | Facility: CLINIC | Age: 69
End: 2024-08-22
Payer: MEDICARE

## 2024-08-22 ENCOUNTER — OFFICE VISIT (OUTPATIENT)
Dept: URBAN - METROPOLITAN AREA SURGERY CENTER 25 | Facility: SURGERY CENTER | Age: 69
End: 2024-08-22
Payer: MEDICARE

## 2024-08-22 DIAGNOSIS — K63.5 BENIGN COLON POLYPS: ICD-10-CM

## 2024-08-22 DIAGNOSIS — Z86.010 ADENOMAS PERSONAL HISTORY OF COLONIC POLYPS: ICD-10-CM

## 2024-08-22 DIAGNOSIS — R93.3 ABNORMAL FINDINGS ON DIAGNOSTIC IMAGING OF OTHER PARTS OF DIGESTIVE TRACT: ICD-10-CM

## 2024-08-22 DIAGNOSIS — Z86.010 PERSONAL HISTORY OF COLONIC POLYPS: ICD-10-CM

## 2024-08-22 DIAGNOSIS — Z12.11 COLON CANCER SCREENING (HIGH RISK): ICD-10-CM

## 2024-08-22 DIAGNOSIS — K63.5 BENIGN COLON POLYP: ICD-10-CM

## 2024-08-22 DIAGNOSIS — K57.30 COLON, DIVERTICULOSIS: ICD-10-CM

## 2024-08-22 DIAGNOSIS — K63.5 POLYP OF COLON: ICD-10-CM

## 2024-08-22 PROCEDURE — 45385 COLONOSCOPY W/LESION REMOVAL: CPT | Performed by: INTERNAL MEDICINE

## 2024-08-22 PROCEDURE — 88305 TISSUE EXAM BY PATHOLOGIST: CPT | Performed by: PATHOLOGY

## 2024-08-22 PROCEDURE — 00812 ANES LWR INTST SCR COLSC: CPT | Performed by: ANESTHESIOLOGY

## 2024-08-22 PROCEDURE — 00812 ANES LWR INTST SCR COLSC: CPT | Performed by: NURSE ANESTHETIST, CERTIFIED REGISTERED

## 2024-08-22 RX ORDER — APIXABAN 5 MG/1
1 TABLET TABLET, FILM COATED ORAL TWICE A DAY
Status: ACTIVE | COMMUNITY

## 2024-08-22 RX ORDER — TIRZEPATIDE 5 MG/.5ML
0.5 ML INJECTION, SOLUTION SUBCUTANEOUS
Status: ACTIVE | COMMUNITY

## 2024-08-22 RX ORDER — GLUCOSAMINE/CHONDR SU A SOD 750-600 MG
1 CAPSULE TABLET ORAL ONCE A DAY
Status: ACTIVE | COMMUNITY

## 2024-08-22 RX ORDER — CALCIUM CARBONATE/VITAMIN D3 600 MG-20
1 TABLET WITH A MEAL TABLET ORAL ONCE A DAY
Status: ACTIVE | COMMUNITY

## 2024-08-22 RX ORDER — DOFETILIDE 0.12 MG/1
1 CAPSULE CAPSULE ORAL TWICE A DAY
Status: ACTIVE | COMMUNITY

## 2024-08-22 RX ORDER — ESTRADIOL 0.1 MG/G
AS DIRECTED CREAM VAGINAL
Status: ACTIVE | COMMUNITY

## 2024-08-22 RX ORDER — AMOXICILLIN AND CLAVULANATE POTASSIUM 875; 125 MG/1; MG/1
1 TABLET TABLET, FILM COATED ORAL
Qty: 20 | Refills: 0 | Status: ON HOLD | COMMUNITY

## 2024-08-22 RX ORDER — LEVOTHYROXINE SODIUM 0.05 MG/1
1 TABLET IN THE MORNING ON AN EMPTY STOMACH TABLET ORAL ONCE A DAY
Status: ACTIVE | COMMUNITY

## 2024-08-22 RX ORDER — ROSUVASTATIN CALCIUM 20 MG/1
1 TABLET TABLET, FILM COATED ORAL ONCE A DAY
Status: ACTIVE | COMMUNITY

## 2024-08-22 RX ORDER — MIRABEGRON 25 MG/1
1 TABLET TABLET, FILM COATED, EXTENDED RELEASE ORAL ONCE A DAY
Status: ACTIVE | COMMUNITY

## 2024-10-17 ENCOUNTER — OFFICE VISIT (OUTPATIENT)
Dept: URBAN - METROPOLITAN AREA CLINIC 113 | Facility: CLINIC | Age: 69
End: 2024-10-17

## 2024-10-24 ENCOUNTER — OFFICE VISIT (OUTPATIENT)
Dept: URBAN - METROPOLITAN AREA CLINIC 113 | Facility: CLINIC | Age: 69
End: 2024-10-24
Payer: MEDICARE

## 2024-10-24 VITALS
WEIGHT: 206.2 LBS | RESPIRATION RATE: 18 BRPM | HEIGHT: 69 IN | BODY MASS INDEX: 30.54 KG/M2 | DIASTOLIC BLOOD PRESSURE: 73 MMHG | TEMPERATURE: 97.7 F | HEART RATE: 64 BPM | SYSTOLIC BLOOD PRESSURE: 127 MMHG

## 2024-10-24 DIAGNOSIS — K57.30 COLON, DIVERTICULOSIS: ICD-10-CM

## 2024-10-24 DIAGNOSIS — K59.09 CHRONIC CONSTIPATION: ICD-10-CM

## 2024-10-24 DIAGNOSIS — Z86.0101 HISTORY OF ADENOMATOUS POLYP OF COLON: ICD-10-CM

## 2024-10-24 PROCEDURE — 99213 OFFICE O/P EST LOW 20 MIN: CPT | Performed by: NURSE PRACTITIONER

## 2024-10-24 RX ORDER — MIRABEGRON 25 MG/1
1 TABLET TABLET, FILM COATED, EXTENDED RELEASE ORAL ONCE A DAY
Status: ACTIVE | COMMUNITY

## 2024-10-24 RX ORDER — DOFETILIDE 0.12 MG/1
1 CAPSULE CAPSULE ORAL TWICE A DAY
Status: ACTIVE | COMMUNITY

## 2024-10-24 RX ORDER — APIXABAN 5 MG/1
1 TABLET TABLET, FILM COATED ORAL TWICE A DAY
Status: ACTIVE | COMMUNITY

## 2024-10-24 RX ORDER — GLUCOSAMINE/CHONDR SU A SOD 750-600 MG
1 CAPSULE TABLET ORAL ONCE A DAY
Status: ACTIVE | COMMUNITY

## 2024-10-24 RX ORDER — CALCIUM CARBONATE/VITAMIN D3 600 MG-20
1 TABLET WITH A MEAL TABLET ORAL ONCE A DAY
Status: ACTIVE | COMMUNITY

## 2024-10-24 RX ORDER — ESTRADIOL 0.1 MG/G
AS DIRECTED CREAM VAGINAL
Status: ACTIVE | COMMUNITY

## 2024-10-24 RX ORDER — LEVOTHYROXINE SODIUM 0.05 MG/1
1 TABLET IN THE MORNING ON AN EMPTY STOMACH TABLET ORAL ONCE A DAY
Status: ACTIVE | COMMUNITY

## 2024-10-24 RX ORDER — ROSUVASTATIN CALCIUM 20 MG/1
1 TABLET TABLET, FILM COATED ORAL ONCE A DAY
Status: ACTIVE | COMMUNITY

## 2024-10-24 RX ORDER — TIRZEPATIDE 5 MG/.5ML
0.5 ML INJECTION, SOLUTION SUBCUTANEOUS
Status: ACTIVE | COMMUNITY

## 2024-10-24 RX ORDER — AMOXICILLIN AND CLAVULANATE POTASSIUM 875; 125 MG/1; MG/1
1 TABLET TABLET, FILM COATED ORAL
Qty: 20 | Refills: 0 | Status: ON HOLD | COMMUNITY

## 2024-10-24 NOTE — HPI-TODAY'S VISIT:
This is a 69-year-old female with a history of atrial fibrillation on Tikosyn and Eliquis status post ablation x 4, uncomplicated diverticulitis, adenomatous colon polyps due surveillance in 2029, chronic constipation, and left lower quadrant abdominal pain presenting for follow-up.  She was last seen 6/13/2024.  Chronic constipation was controlled with daily MiraLAX.  She had a history of adenomatous colon polyps and was due surveillance.  Colonoscopy was scheduled.  She reported elevated tumor markers.  Urogynecology with a subsequent negative CT scan.  Records were requested.  Colonoscopy 8/22/2024:BBPS 9, grade 1 nonbleeding internal hemorrhoids, sigmoid and descending diverticulosis, removal of 3 sessile 4 to 6 mm rectosigmoid polyps.  Pathology: Sigmoid polyps were hyperplastic.  Due to her prior history of adenomas, surveillance recommended in 2029.  She is doing well.  She is taking MiraLAX daily and is having regular bowel movements.  She has nausea associated with taking Wegovy.  This usually occurs for 2 days every week, 3 days after her injection.  She denies vomiting.  She reports a feeling of fullness associated with taking Wegovy but denies abdominal pain or any other abdominal symptoms.  She has continued to successfully lose weight.  Our records reveal a 21 pound weight loss since her last visit in June.  In total, she has lost over 50 pounds.

## 2024-10-24 NOTE — HPI-OTHER HISTORIES
Colonoscopy 8/22/2024:BBPS 9, grade 1 nonbleeding internal hemorrhoids, sigmoid and descending diverticulosis, removal of 3 sessile 4 to 6 mm rectosigmoid polyps. Pathology: Sigmoid polyps were hyperplastic. Due to her prior history of adenomas, surveillance recommended in 2029.  CT of the abdomen and pelvis with and without contrast 2/23/2023:Complete versus near complete resolution of acute sigmoid diverticulitis seen on recent outside study February 18, 2023.  The abnormality adjacent to the sigmoid colon of concern on this recent prior study is confirmed to reflect the left ovary on today's exam, this ovary now normal in appearance without inflammation evident.  No diverticulitis complications evident on today's exam.   Colonoscopy 9/9/2021:BBPS 9, removal of 4 sessile transverse, hepatic flexure, and proximal ascending 7 to 13 mm polyps, sigmoid and descending diverticulosis, normal terminal ileum, mild grade 1 internal hemorrhoids.  Pathology: Hepatic flexure polyps were tubular adenomas, ascending polyps were sessile serrated adenomas, and transverse polyps were sessile serrated adenomas.

## 2024-10-26 PROBLEM — 429047008: Status: ACTIVE | Noted: 2024-10-26
